# Patient Record
Sex: FEMALE | Race: WHITE | NOT HISPANIC OR LATINO | Employment: OTHER | URBAN - METROPOLITAN AREA
[De-identification: names, ages, dates, MRNs, and addresses within clinical notes are randomized per-mention and may not be internally consistent; named-entity substitution may affect disease eponyms.]

---

## 2017-08-17 PROBLEM — T45.515A: Status: ACTIVE | Noted: 2017-08-17

## 2017-12-28 ENCOUNTER — HOSPITAL ENCOUNTER (INPATIENT)
Facility: HOSPITAL | Age: 82
LOS: 1 days | Discharge: HOME | DRG: 087 | End: 2017-12-29
Attending: EMERGENCY MEDICINE | Admitting: PSYCHIATRY & NEUROLOGY
Payer: MEDICARE

## 2017-12-28 DIAGNOSIS — S00.83XA FACIAL HEMATOMA, INITIAL ENCOUNTER: Primary | ICD-10-CM

## 2017-12-28 DIAGNOSIS — M25.551 PAIN OF RIGHT HIP JOINT: ICD-10-CM

## 2017-12-28 DIAGNOSIS — S02.401A MAXILLARY SINUS FRACTURE, CLOSED, INITIAL ENCOUNTER: ICD-10-CM
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
## 2017-12-28 DIAGNOSIS — I60.9 SAH (SUBARACHNOID HEMORRHAGE): ICD-10-CM

## 2017-12-28 DIAGNOSIS — R06.00 DYSPNEA: ICD-10-CM
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
## 2017-12-28 LAB
ALBUMIN SERPL BCP-MCNC: 3 G/DL
ALP SERPL-CCNC: 81 U/L
ALT SERPL W/O P-5'-P-CCNC: 9 U/L
ANION GAP SERPL CALC-SCNC: 10 MMOL/L
AST SERPL-CCNC: 24 U/L
BASOPHILS # BLD AUTO: 0.04 K/UL
BASOPHILS NFR BLD: 0.3 %
BILIRUB SERPL-MCNC: 0.5 MG/DL
BNP SERPL-MCNC: 134 PG/ML
BUN SERPL-MCNC: 33 MG/DL
CALCIUM SERPL-MCNC: 9.6 MG/DL
CHLORIDE SERPL-SCNC: 104 MMOL/L
CHOLEST SERPL-MCNC: 213 MG/DL
CHOLEST/HDLC SERPL: 4.7 {RATIO}
CO2 SERPL-SCNC: 26 MMOL/L
CREAT SERPL-MCNC: 1.3 MG/DL
DIFFERENTIAL METHOD: ABNORMAL
EOSINOPHIL # BLD AUTO: 0.1 K/UL
EOSINOPHIL NFR BLD: 0.4 %
ERYTHROCYTE [DISTWIDTH] IN BLOOD BY AUTOMATED COUNT: 13 %
EST. GFR  (AFRICAN AMERICAN): 40.6 ML/MIN/1.73 M^2
EST. GFR  (NON AFRICAN AMERICAN): 35.2 ML/MIN/1.73 M^2
GLUCOSE SERPL-MCNC: 191 MG/DL
HCT VFR BLD AUTO: 37.1 %
HDLC SERPL-MCNC: 45 MG/DL
HDLC SERPL: 21.1 %
HGB BLD-MCNC: 11.9 G/DL
IMM GRANULOCYTES # BLD AUTO: 0.09 K/UL
IMM GRANULOCYTES NFR BLD AUTO: 0.7 %
INR PPP: 1.7
LDLC SERPL CALC-MCNC: 140.8 MG/DL
LYMPHOCYTES # BLD AUTO: 1.5 K/UL
LYMPHOCYTES NFR BLD: 10.7 %
MCH RBC QN AUTO: 29.7 PG
MCHC RBC AUTO-ENTMCNC: 32.1 G/DL
MCV RBC AUTO: 93 FL
MONOCYTES # BLD AUTO: 0.8 K/UL
MONOCYTES NFR BLD: 5.9 %
NEUTROPHILS # BLD AUTO: 11.2 K/UL
NEUTROPHILS NFR BLD: 82 %
NONHDLC SERPL-MCNC: 168 MG/DL
NRBC BLD-RTO: 0 /100 WBC
PLATELET # BLD AUTO: 157 K/UL
PMV BLD AUTO: 12.5 FL
POTASSIUM SERPL-SCNC: 4.3 MMOL/L
PROT SERPL-MCNC: 7.1 G/DL
PROTHROMBIN TIME: 17.1 SEC
RBC # BLD AUTO: 4.01 M/UL
SODIUM SERPL-SCNC: 140 MMOL/L
TRIGL SERPL-MCNC: 136 MG/DL
WBC # BLD AUTO: 13.61 K/UL

## 2017-12-28 PROCEDURE — 80061 LIPID PANEL: CPT

## 2017-12-28 PROCEDURE — 80053 COMPREHEN METABOLIC PANEL: CPT | Mod: 91

## 2017-12-28 PROCEDURE — 25000003 PHARM REV CODE 250: Performed by: EMERGENCY MEDICINE

## 2017-12-28 PROCEDURE — 85025 COMPLETE CBC W/AUTO DIFF WBC: CPT | Mod: 91

## 2017-12-28 PROCEDURE — 96365 THER/PROPH/DIAG IV INF INIT: CPT

## 2017-12-28 PROCEDURE — 84443 ASSAY THYROID STIM HORMONE: CPT

## 2017-12-28 PROCEDURE — 12000002 HC ACUTE/MED SURGE SEMI-PRIVATE ROOM

## 2017-12-28 PROCEDURE — 83880 ASSAY OF NATRIURETIC PEPTIDE: CPT

## 2017-12-28 PROCEDURE — 99291 CRITICAL CARE FIRST HOUR: CPT | Mod: ,,, | Performed by: EMERGENCY MEDICINE

## 2017-12-28 PROCEDURE — 86850 RBC ANTIBODY SCREEN: CPT

## 2017-12-28 PROCEDURE — 96366 THER/PROPH/DIAG IV INF ADDON: CPT

## 2017-12-28 PROCEDURE — 99285 EMERGENCY DEPT VISIT HI MDM: CPT

## 2017-12-28 PROCEDURE — 85610 PROTHROMBIN TIME: CPT | Mod: 91

## 2017-12-28 RX ORDER — GLUCAGON 1 MG
1 KIT INJECTION
Status: DISCONTINUED | OUTPATIENT
Start: 2017-12-29 | End: 2017-12-29 | Stop reason: HOSPADM

## 2017-12-28 RX ORDER — SODIUM CHLORIDE 0.9 % (FLUSH) 0.9 %
3 SYRINGE (ML) INJECTION EVERY 8 HOURS
Status: DISCONTINUED | OUTPATIENT
Start: 2017-12-29 | End: 2017-12-29 | Stop reason: HOSPADM

## 2017-12-28 RX ORDER — INSULIN ASPART 100 [IU]/ML
0-5 INJECTION, SOLUTION INTRAVENOUS; SUBCUTANEOUS EVERY 6 HOURS PRN
Status: DISCONTINUED | OUTPATIENT
Start: 2017-12-29 | End: 2017-12-29 | Stop reason: HOSPADM

## 2017-12-28 RX ORDER — NICARDIPINE HYDROCHLORIDE 0.2 MG/ML
5 INJECTION INTRAVENOUS CONTINUOUS
Status: DISCONTINUED | OUTPATIENT
Start: 2017-12-28 | End: 2017-12-29 | Stop reason: HOSPADM

## 2017-12-28 RX ORDER — AMOXICILLIN 250 MG
1 CAPSULE ORAL 2 TIMES DAILY
Status: DISCONTINUED | OUTPATIENT
Start: 2017-12-29 | End: 2017-12-29 | Stop reason: HOSPADM

## 2017-12-28 RX ORDER — ONDANSETRON 2 MG/ML
4 INJECTION INTRAMUSCULAR; INTRAVENOUS EVERY 8 HOURS PRN
Status: DISCONTINUED | OUTPATIENT
Start: 2017-12-29 | End: 2017-12-29 | Stop reason: HOSPADM

## 2017-12-28 RX ADMIN — NICARDIPINE HYDROCHLORIDE 7.5 MG/HR: 0.2 INJECTION, SOLUTION INTRAVENOUS at 10:12

## 2017-12-29 ENCOUNTER — TELEPHONE (OUTPATIENT)
Dept: NEUROSURGERY | Facility: CLINIC | Age: 82
End: 2017-12-29

## 2017-12-29 VITALS
RESPIRATION RATE: 17 BRPM | TEMPERATURE: 98 F | BODY MASS INDEX: 17.58 KG/M2 | DIASTOLIC BLOOD PRESSURE: 60 MMHG | HEIGHT: 64 IN | OXYGEN SATURATION: 97 % | HEART RATE: 83 BPM | WEIGHT: 103 LBS | SYSTOLIC BLOOD PRESSURE: 119 MMHG

## 2017-12-29 DIAGNOSIS — I60.9 SAH (SUBARACHNOID HEMORRHAGE): Primary | ICD-10-CM

## 2017-12-29 LAB
ABO + RH BLD: NORMAL
ALBUMIN SERPL BCP-MCNC: 2.7 G/DL
ALP SERPL-CCNC: 72 U/L
ALT SERPL W/O P-5'-P-CCNC: 8 U/L
ANION GAP SERPL CALC-SCNC: 9 MMOL/L
AST SERPL-CCNC: 17 U/L
BASOPHILS # BLD AUTO: 0.03 K/UL
BASOPHILS NFR BLD: 0.3 %
BILIRUB SERPL-MCNC: 0.6 MG/DL
BLD GP AB SCN CELLS X3 SERPL QL: NORMAL
BUN SERPL-MCNC: 34 MG/DL
CALCIUM SERPL-MCNC: 9.2 MG/DL
CHLORIDE SERPL-SCNC: 107 MMOL/L
CO2 SERPL-SCNC: 26 MMOL/L
CREAT SERPL-MCNC: 1.1 MG/DL
DIFFERENTIAL METHOD: ABNORMAL
EOSINOPHIL # BLD AUTO: 0 K/UL
EOSINOPHIL NFR BLD: 0.2 %
ERYTHROCYTE [DISTWIDTH] IN BLOOD BY AUTOMATED COUNT: 13.1 %
EST. GFR  (AFRICAN AMERICAN): 49.7 ML/MIN/1.73 M^2
EST. GFR  (NON AFRICAN AMERICAN): 43.1 ML/MIN/1.73 M^2
ESTIMATED AVG GLUCOSE: 103 MG/DL
GLUCOSE SERPL-MCNC: 132 MG/DL
HBA1C MFR BLD HPLC: 5.2 %
HCT VFR BLD AUTO: 33 %
HGB BLD-MCNC: 10.7 G/DL
IMM GRANULOCYTES # BLD AUTO: 0.04 K/UL
IMM GRANULOCYTES NFR BLD AUTO: 0.5 %
INR PPP: 1.3
LYMPHOCYTES # BLD AUTO: 1.5 K/UL
LYMPHOCYTES NFR BLD: 17.5 %
MAGNESIUM SERPL-MCNC: 1.3 MG/DL
MCH RBC QN AUTO: 29.6 PG
MCHC RBC AUTO-ENTMCNC: 32.4 G/DL
MCV RBC AUTO: 91 FL
MONOCYTES # BLD AUTO: 0.7 K/UL
MONOCYTES NFR BLD: 7.9 %
NEUTROPHILS # BLD AUTO: 6.4 K/UL
NEUTROPHILS NFR BLD: 73.6 %
NRBC BLD-RTO: 0 /100 WBC
PHOSPHATE SERPL-MCNC: 1.6 MG/DL
PLATELET # BLD AUTO: 179 K/UL
PMV BLD AUTO: 12.1 FL
POTASSIUM SERPL-SCNC: 3.7 MMOL/L
PROT SERPL-MCNC: 6.2 G/DL
PROTHROMBIN TIME: 13 SEC
RBC # BLD AUTO: 3.61 M/UL
SODIUM SERPL-SCNC: 142 MMOL/L
TSH SERPL DL<=0.005 MIU/L-ACNC: 2.05 UIU/ML
WBC # BLD AUTO: 8.7 K/UL

## 2017-12-29 PROCEDURE — 80053 COMPREHEN METABOLIC PANEL: CPT

## 2017-12-29 PROCEDURE — 85610 PROTHROMBIN TIME: CPT

## 2017-12-29 PROCEDURE — 84100 ASSAY OF PHOSPHORUS: CPT

## 2017-12-29 PROCEDURE — 25000003 PHARM REV CODE 250: Performed by: EMERGENCY MEDICINE

## 2017-12-29 PROCEDURE — 99239 HOSP IP/OBS DSCHRG MGMT >30: CPT | Mod: ,,, | Performed by: PSYCHIATRY & NEUROLOGY

## 2017-12-29 PROCEDURE — 85025 COMPLETE CBC W/AUTO DIFF WBC: CPT

## 2017-12-29 PROCEDURE — 83036 HEMOGLOBIN GLYCOSYLATED A1C: CPT

## 2017-12-29 PROCEDURE — 83735 ASSAY OF MAGNESIUM: CPT

## 2017-12-29 RX ADMIN — NICARDIPINE HYDROCHLORIDE 7.5 MG/HR: 0.2 INJECTION, SOLUTION INTRAVENOUS at 12:12

## 2017-12-29 NOTE — DISCHARGE INSTRUCTIONS
You do no have a subarachnoid hemorrhage.    Sleep with head of bed elevated, no nose blowing.  Follow-up with ENT

## 2017-12-29 NOTE — H&P
History & Physical  Neurocritical Care    Admit Date: 12/28/2017  LOS: 0    Code Status: Full Code     CC: SAH (subarachnoid hemorrhage)    SUBJECTIVE:     History of Present Illness:   89 y.o. female with a PMHx of DVT/PE (on coumadin) and lupus who presents to the ED via EMS as a transfer from [place] with a chief complaint of SAH and R maxillary fracture sustained today s/p fall. Patient was reportedly found on the ground s/p fall at the assisted living facility. She does not remember the fall. The patient endorses severe right face pain, right hip and leg pain and lower back pain.     The head CT done at the other facility shows a small subarachnoid hemorrhage of the posterior right parietal and frontal lobe, and a comminuted fracture of the anterior lateral wall of the maxillary sinus with hemorrhage into the sinus and inferior orbital rim. She is oriented to person, place and time.     Pt reportedly had desaturation issue en route and placed on non-rebreather. Pt currently oxygenating well on 2L NC. Pt received Vit K at OSH.        Past Medical History:   Diagnosis Date    Anticoagulant long-term use     DVT (deep venous thrombosis)     Lupus     Pulmonary embolus      No past surgical history on file.  Current Facility-Administered Medications on File Prior to Encounter   Medication Dose Route Frequency Provider Last Rate Last Dose    [COMPLETED] albuterol-ipratropium 2.5mg-0.5mg/3mL nebulizer solution 3 mL  3 mL Nebulization ED 1 Time Joao Lucero MD   3 mL at 12/28/17 2107    [COMPLETED] phytonadione vitamin k (AQUA-MEPHYTON) 10 mg in dextrose 5 % 50 mL IVPB  10 mg Intravenous ED 1 Time Jose Miguel Adams MD 50 mL/hr at 12/28/17 2053 10 mg at 12/28/17 2053    [DISCONTINUED] niCARdipine 40 mg/200 mL infusion  5 mg/hr Intravenous Continuous Jose Miguel Adams MD 37.5 mL/hr at 12/28/17 2028 7.5 mg/hr at 12/28/17 2028     Current Outpatient Prescriptions on File Prior to Encounter   Medication Sig  Dispense Refill    warfarin (COUMADIN) 2.5 MG tablet Take 2.5 mg by mouth every evening. Take at 5pm      cyproheptadine (PERIACTIN) 4 mg tablet Take 4 mg by mouth 2 (two) times daily.      potassium chloride (MICRO-K) 10 MEQ CpSR Take 10 mEq by mouth every morning.       propranolol (INDERAL) 20 MG tablet Take 20 mg by mouth 2 (two) times daily.      triamterene-hydrochlorothiazide 37.5-25 mg (DYAZIDE) 37.5-25 mg per capsule Take 1 capsule by mouth every morning.      [DISCONTINUED] acetaminophen (TYLENOL) 325 MG tablet Take 325 mg by mouth every 6 (six) hours as needed for Pain.      [DISCONTINUED] nitrofurantoin (MACRODANTIN) 50 MG capsule Take 50 mg by mouth every evening.       Review of patient's allergies indicates:  No Known Allergies  No family history on file.  Social History   Substance Use Topics    Smoking status: Not on file    Smokeless tobacco: Not on file    Alcohol use Not on file      Review of Symptoms:  Constitutional: Denies fevers, weight loss, chills, or weakness. Endorses R facial, R hip and back pain.   Eyes: Denies changes in vision.  ENT: Denies dysphagia, nasal discharge, ear pain or discharge.  Cardiovascular: Denies chest pain, palpitations, orthopnea, or claudication.  Respiratory: Denies shortness of breath, cough, hemoptysis, or wheezing.  GI: Denies nausea/vomitting, hematochezia, melena, abd pain, or changes in appetite.  : Denies dysuria, incontinence, or hematuria.  Musculoskeletal: Endorses R facial, R hip and back pain.   Skin/breast: Denies rashes, lumps, lesions, or discharge.  Neurologic: Denies headache, dizziness, vertigo, or paresthesias.  Psychiatric: Denies changes in mood or hallucinations.  Endocrine: Denies polyuria, polydipsia, heat/cold intolerance.  Hematologic/Lymph: Denies lymphadenopathy, easy bruising or easy bleeding.  Allergic/Immunologic: Denies rash, rhinitis.     OBJECTIVE:   Vital Signs (Most Recent):   Pulse: 100 (12/28/17 2240)  Resp: 20  (12/28/17 2240)  BP: (!) 132/59 (12/28/17 2240)  SpO2: 97 % (12/28/17 2240)    Vital Signs (24h Range):   Temp:  [98.8 °F (37.1 °C)] 98.8 °F (37.1 °C)  Pulse:  [] 100  Resp:  [18-31] 20  SpO2:  [97 %-100 %] 97 %  BP: (132-218)/(59-93) 132/59    I & O (Last 24h):  No intake or output data in the 24 hours ending 12/28/17 2341  Physical Exam:  GA: Alert, comfortable, no acute distress.   HEENT: No scleral icterus or JVD.   Pulmonary: Clear to auscultation A/P/L. No wheezing, crackles, or rhonchi.  Cardiac: RRR S1 & S2 w/o rubs/murmurs/gallops.   Abdominal: Bowel sounds present x 4. No appreciable hepatosplenomegaly.  Skin: R facial ecchymosis and R periorbital edema. R hip TTP.   Neuro:  --GCS: E4 V5 M6  --Mental Status:  AAOx3  --CN II-XII grossly intact.   --Pupils 3mm, PERRL. Irregular R pupil  --Corneal reflex, gag, cough intact.  --LUE strength: 4/5  --RUE strength: 4/5  --LLE strength: 4/5  --RLE strength: 4/5    Lines/Drains/Airway:          Nutrition/Tube Feeds:   Current Diet Order   Procedures    Diet NPO     No food intake until passes CATRACHO or evaluated by speech.       Labs:  ABG: No results for input(s): PH, PO2, PCO2, HCO3, POCSATURATED, BE in the last 24 hours.  BMP:    Recent Labs  Lab 12/28/17 2242      K 4.3      CO2 26   BUN 33*   CREATININE 1.3   *     LFT:   Lab Results   Component Value Date    AST 24 12/28/2017    ALT 9 (L) 12/28/2017    ALKPHOS 81 12/28/2017    BILITOT 0.5 12/28/2017    ALBUMIN 3.0 (L) 12/28/2017    PROT 7.1 12/28/2017     CBC:   Lab Results   Component Value Date    WBC 13.61 (H) 12/28/2017    HGB 11.9 (L) 12/28/2017    HCT 37.1 12/28/2017    MCV 93 12/28/2017     12/28/2017     Microbiology x 7d:   Microbiology Results (last 7 days)     ** No results found for the last 168 hours. **        Imaging:  CTH:    Final read pending, no observed SAH per my assessment    CT maxillary:    R maxillary fx    I personally reviewed the above  image.    ASSESSMENT/PLAN:     Patient Active Problem List    Diagnosis Date Noted    SAH (subarachnoid hemorrhage) 12/28/2017    Coumarin adverse reaction 08/17/2017     Neuro:   - txf for reported SAH, repeat imaging here initially appears neg for SAH, final read pending  - NSGY consulted, appreciate recs, no need for acute intervention  - pt received Vit K @ OSH, no need for additional reversal  - will d/w staff need to admit to NCC for cont monitoring  - SBP <160    Pulmonary:   - report of desaturation en route, currently stable  - CXR unremarkable  - will cont to monitor, wean O2 as tolerated    Cardiac:   - no n/o dz  - HTN, cardene ggt prn     Renal:    - BUN/Cr mildly elevated  - IVF, monitor Uo/p    ID:   - no s/s of infection  - will cont to monitor    Hem/Onc:   - H/H stable  - no acute issues  - on coumadin for h/o DVT, will hold currently, received Vit K @ OSH, no need for reversal    Endocrine:    - no h/o dz  - SSI    Fluids/Electrolytes/Nutrition/GI:   - advance diet as tolerated  - IVF  - replete lytes prn    Hip pain:   - imaging completed, f/u ortho recs    Dispo: no need to admit to NCC if no SAH noted on imaging, f/u ortho recs    Tremayne Burk MD  [facility]

## 2017-12-29 NOTE — ED TRIAGE NOTES
Pt presents to  ED as transfer from [place] for SAH and depressed maxillary fracture. Pt slipped and fell today. Pt currently takes Coumidin daily. Vitamin K given at [facility]. Pt currently on Cardene for BP control. Pt AAOx4 at this time.

## 2017-12-29 NOTE — TELEPHONE ENCOUNTER
----- Message from John Sullivan MD sent at 12/29/2017  1:15 AM CST -----  Please arrange for this pt to follow up with pa in 2 weeks. Thank you kindly.

## 2017-12-29 NOTE — ED NOTES
Patients caregiver explained to [facility] transportation, caregiver stated that she was comfortable bring her back to [place] in her own car. Patient provided multiple blankets and gowns due to weather. Patients ivs removed, catheter intact. Patients caregiver states that she understands discharge instructions that were provided to her and given paperwork. Catheter removed, patient encouraged to drink lots of fluids throughout the day. Patient placed in vehicle by myself and another RN. No further questions at time of discharge.

## 2017-12-29 NOTE — ED PROVIDER NOTES
Encounter Date: 12/28/2017    SCRIBE #1 NOTE: I, Missy Mireles, am scribing for, and in the presence of, Dr. Church.       History     Chief Complaint   Patient presents with    SAH     transfer from [facility] for SAH and depressed maxillary fracture, pt currently AAOx4, moving all extremities, pt takes Coumidin      Time patient was seen by the provider: 10:31 PM      This is a 89 y.o. female with a PMHx of DVT, PE and lupus who presents to the ED via EMS as a transfer from [place] with a chief complaint of subarachnoid hemorrhage and depressed maxillary fracture sustained today. Patient was reportedly found on the ground s/p fall at the assisted living facility. She does not remember the fall. The patient is on Coumadin. The patient endorses severe right face pain, right hip and leg pain. The head CT done at the other facility shows a small subarachnoid hemorrhage of the posterior right parietal and frontal lobe, and a comminuted depressed fracture of the anterior lateral wall of the maxillary sinus with hemorrhage into the sinus and inferior orbital rim. She is oriented to person and place, not to time.       The history is provided by the patient, the EMS personnel and medical records.     Review of patient's allergies indicates:  No Known Allergies  Past Medical History:   Diagnosis Date    Anticoagulant long-term use     DVT (deep venous thrombosis)     Lupus     Pulmonary embolus      No past surgical history on file.  No family history on file.  Social History   Substance Use Topics    Smoking status: Not on file    Smokeless tobacco: Not on file    Alcohol use Not on file     Review of Systems   Unable to perform ROS: Acuity of condition   HENT:        Positive for right facial pain.    Musculoskeletal: Positive for arthralgias (right hip, leg).       Physical Exam     Initial Vitals   BP Pulse Resp Temp SpO2   -- -- -- -- --      MAP       --         Vitals:    12/29/17 0031   BP: (!) 109/56    Pulse: 94   Resp:        Physical Exam    Nursing note and vitals reviewed.  Constitutional: She appears well-developed and well-nourished. She is not diaphoretic. No distress.   HENT:   Head: Normocephalic.   Mouth/Throat: Oropharynx is clear and moist.   Significant periorbital hematoma on the right.  Significant facial swelling on the right.    Eyes:   Irregular pupil on the right.   Neck: Normal range of motion. Neck supple. No JVD present.   Cardiovascular: Normal rate, regular rhythm, normal heart sounds and intact distal pulses.   Pulmonary/Chest: No respiratory distress. She has no wheezes. She has no rhonchi. She has rales (left base).   Abdominal: Soft. She exhibits no distension. There is no tenderness.   Genitourinary:   Genitourinary Comments: Cai catheter in place.   Musculoskeletal: Normal range of motion. She exhibits tenderness (right hip). She exhibits no edema.   Lymphadenopathy:     She has no cervical adenopathy.   Neurological: She is alert. She has normal strength. No cranial nerve deficit or sensory deficit.   Alert and oriented to person and place, not to time.    Skin: Skin is warm and dry.         ED Course   Procedures  Labs Reviewed   CBC W/ AUTO DIFFERENTIAL - Abnormal; Notable for the following:        Result Value    WBC 13.61 (*)     Hemoglobin 11.9 (*)     Immature Granulocytes 0.7 (*)     Gran # 11.2 (*)     Immature Grans (Abs) 0.09 (*)     Gran% 82.0 (*)     Lymph% 10.7 (*)     All other components within normal limits   COMPREHENSIVE METABOLIC PANEL - Abnormal; Notable for the following:     Glucose 191 (*)     BUN, Bld 33 (*)     Albumin 3.0 (*)     ALT 9 (*)     eGFR if  40.6 (*)     eGFR if non  35.2 (*)     All other components within normal limits   PROTIME-INR - Abnormal; Notable for the following:     Prothrombin Time 17.1 (*)     INR 1.7 (*)     All other components within normal limits   B-TYPE NATRIURETIC PEPTIDE - Abnormal; Notable  for the following:      (*)     All other components within normal limits    Narrative:     ADD ON LIPID AND TSH PER DR JAZMYN CARD/ORDER# [id number] AND   [id number] @ 23:34 12/28/17   LIPID PANEL - Abnormal; Notable for the following:     Cholesterol 213 (*)     All other components within normal limits    Narrative:     ADD ON LIPID AND TSH PER DR JAZMYN CARD/ORDER# [id number] AND   [id number] @ 23:34 12/28/17   HEMOGLOBIN A1C   LIPID PANEL   TSH   TSH    Narrative:     ADD ON LIPID AND TSH PER DR JAZMYN CARD/ORDER# [id number] AND   [id number] @ 23:34 12/28/17   HEMOGLOBIN A1C   TYPE & SCREEN   POCT GLUCOSE MONITORING CONTINUOUS   POCT GLUCOSE MONITORING CONTINUOUS     EKG Readings: (Independently Interpreted)   Normal sinus at 82 with no ischemic changes       X-Rays:   Independently Interpreted Readings:   Other Readings:  Chest x-ray: No acute process    Medical Decision Making:   History:   Old Medical Records: I decided to obtain old medical records.  Old Records Summarized: records from another hospital.       <> Summary of Records: Transfer patient. She is 89, presented via EMS after found on the ground at the assisted living facility. She tripped on the way to the dining room table. She does not remember the fall. She's on Coumadin. Her complaint at the other hospital was right face pain, right hip and leg pain. History of DVT, lupus, PE.     Head CT done at 6:46 PM at the other facility shows a small subarachnoid hemorrhage of the posterior right parietal and frontal lobe, and a comminuted depressed fracture of the anterior lateral wall of the maxillary sinus with hemorrhage into the sinus and inferior orbital rim.  Initial Assessment:   Emergent evaluation of 89 y.o. female transferred from outside facility for emergent Neuro Critical Care consult and Neurosurgery consult. On arrival, patient is awake, alert, and oriented x2, following all commands, answering questions appropriately. She's  neurologically intact. Neuro Critical Care and Neurosurgery consulted. She was reversed with vitamin K.     10:41 PM Neuro Critical Care contacted, case discussed. They will come evaluate the patient.     11:30 PM  NS at bedside, evaluated patient.  Pt will be admitted to [facility]  Clinical Tests:   Lab Tests: Ordered and Reviewed  Radiological Study: Ordered and Reviewed  Other:   I have discussed this case with another health care provider.            Scribe Attestation:   Scribe #1: I performed the above scribed service and the documentation accurately describes the services I performed. I attest to the accuracy of the note.    Attending Attestation:         Attending Critical Care:   Critical Care Times:   ==============================================================  · Total Critical Care Time - exclusive of procedural time: 35 minutes.  ==============================================================  Critical care was necessary to treat or prevent imminent or life-threatening deterioration of the following conditions: CNS failure (SAH).   Critical care was time spent personally by me on the following activities: examination of patient, ordering lab, x-rays, and/or EKG, review of old charts, ordering and performing treatments and interventions, discussion with consultants and evaluation of patient's response to treatment.   Critical Care Condition: potentially life-threatening               ED Course      Clinical Impression:   The primary encounter diagnosis was Facial hematoma, initial encounter. Diagnoses of Dyspnea, SAH (subarachnoid hemorrhage), and Maxillary sinus fracture, closed, initial encounter were also pertinent to this visit.    Disposition:   Disposition: Admitted  Condition: Serious                        Eryn Church MD  12/29/17 0051       Eryn Church MD  12/29/17 0052

## 2017-12-29 NOTE — PT/OT/SLP PROGRESS
Occupational Therapy  Not Seen      Patient Name:  Damaris Gutierrez   MRN:  [id number]    Per RN, Patient with plans for d/c and return to [facility].     LEILA Denise  12/29/2017

## 2017-12-29 NOTE — ED NOTES
The patient is awake, alert and cooperative with a calm affect, patient is aware of environment. Airway is open and patent, respirations are spontaneous, normal respiratory effort and rate noted, skin warm and dry, moves all extremities well, appearance: no apparent distress noted. Side rails x 2. Call bell within reach. Will continue to monitor.  Patient aware she is waiting for [facility] transportation.

## 2017-12-29 NOTE — CONSULTS
Consult Note  Neurosurgery    Admit Date: 12/28/2017  LOS: 1    Code Status: Full Code     CC: SAH (subarachnoid hemorrhage)    SUBJECTIVE:     History of Present Illness: 89 yo female with pmh of dvt on warfarin (INR 1.7) admitted to [facility] s/p mechanical fall for multiple mild facial fractures and concern for tSAH.     On exam pt is oriented to name only, otherwise with no neuro deficit. Periorbital trauma noted on exam on right. EOM normal. PERRL. No vomiting. No rhinorrhea. No positional headaches.    Interval imaging shows mild minimally displaced right orbital wall and floor and anterior maxillary wall fractures. Head CT without acute intracranial process. Cervical spine CT without acute process.    Neurosurgery is consulted for closed head injury in patient on warfarin.           OBJECTIVE:   Vital Signs (Most Recent):   Pulse: 94 (12/29/17 0031)  Resp: 20 (12/28/17 2240)  BP: (!) 109/56 (12/29/17 0031)  SpO2: 97 % (12/29/17 0031)    Vital Signs (24h Range):   Temp:  [98.8 °F (37.1 °C)] 98.8 °F (37.1 °C)  Pulse:  [] 94  Resp:  [18-31] 20  SpO2:  [93 %-100 %] 97 %  BP: (109-218)/(56-93) 109/56      I & O (Last 24h):  No intake or output data in the 24 hours ending 12/29/17 0109    Physical Exam:  General: well developed, well nourished, no distress.   Head: normocephalic, left periorbital edema and ecchymosis.  Cervical Spine: No midline tenderness to palpation.  Thoracolumbosacral Spine: No midline tenderness to palpation.  GCS: Motor: 6/Verbal: 5/Eyes: 4 GCS Total: 14  Mental Status: Awake, Alert, Oriented to name only  Language: No aphasia  Speech: No dysarthria  Facial Droop: None   Cranial nerves: CN III-XII grossly intact.  Visual Fields: Intact.   Eyes: Pupils equal and reactive to light. Intact Conjugate horizontal and vertical pursuit. No nystagmus. No gaze deviation.   Pulmonary: No distress.  Sensory: No deficit.  Propioception: No deficit in 1st digit of toe bilaterally.  Rectal Tone: Not  tested.  Drift: None.  Upper Extremity Ataxia: No Dysmetria Bilaterally  Lower Extremity Ataxia: Not tested.  Dysdiadochokinesia: Not tested.  Reflexes: 2+ patellar bilaterally.  Daniel: Absent  Clonus: Absent  Babinski: Absent  Romberg: Not Tested  Pulses: Brisk and symmetric radial, DP and tibial pulses.  Motor Strength:    Strength  Shoulder Abduction Elbow Extension Elbow Flexion Wrist Extension Wrist Flexion Finger Opposition Finger Add Finger Abd   Upper: R 5/5 5/5 5/5 5/5 5/5 5/5 5/5 5/5    L 5/5 5/5 5/5 5/5 5/5 5/5 5/5 5/5     Hip Flexion Knee Extension Knee  Flexion Ankle Dflexion Ankle Pflexion EHL     Lower: R 5/5 5/5 5/5 5/5 5/5 5/5      L 5/5 5/5 5/5 5/5 5/5 5/5             Lines/Drains/Airway:          Nutrition/Tube Feeds:   Current Diet Order   Procedures    Diet NPO     No food intake until passes CATRACHO or evaluated by speech.       Labs:  ABG: No results for input(s): PH, PO2, PCO2, HCO3, POCSATURATED, BE in the last 24 hours.  BMP:  Recent Labs  Lab 12/28/17  2242      K 4.3      CO2 26   BUN 33*   CREATININE 1.3   *     LFT: Lab Results   Component Value Date    AST 24 12/28/2017    ALT 9 (L) 12/28/2017    ALKPHOS 81 12/28/2017    BILITOT 0.5 12/28/2017    ALBUMIN 3.0 (L) 12/28/2017    PROT 7.1 12/28/2017     CBC:   Lab Results   Component Value Date    WBC 13.61 (H) 12/28/2017    HGB 11.9 (L) 12/28/2017    HCT 37.1 12/28/2017    MCV 93 12/28/2017     12/28/2017     Microbiology x 7d:   Microbiology Results (last 7 days)     ** No results found for the last 168 hours. **            ASSESSMENT/PLAN:   89 yo female with pmh of dvt on warfarin now s/p mechanical fall with multiple right mild facial fractures and closed head injury. No acute intracranial process on interval imaging. Pt is admitted to neurocritical care.     --No acute neurosurgical intervention required.  --No need from neurosurgical perspective for anti-coagulant reversal.  --Cleared for discharge from  neurosurgical perspective.  --Will arrange for pt to follow up in clinic in two weeks for interval imaging.  --Discussed above with patient and primary team.  --Neurosurgery will be signing off, please contact us with any questions or concerns.     John Sullivan

## 2017-12-29 NOTE — CONSULTS
Inpatient consult to Physical Medicine Rehab  Consult performed by: NOE MAGAÑA  Consult ordered by: ORQUIDEA BLANTON  Reason for consult: assess rehab needs      Reviewed patient history and current admission.  Rehab team following.  Full consult to follow.    KEVYN Gamboa, FNP-C  Physical Medicine & Rehabilitation   12/29/2017  Spectralink: [id number]

## 2017-12-29 NOTE — CONSULTS
Consult Note  Orthopedic Surgery      SUBJECTIVE:     History of Present Illness:  Damaris Gutierrez is a 89 y.o. female who presents with right sided face pain, head ache, and right hip pain after a fall from standing at nursing home. Patient does not recall events, but reports but endorses hitting head. Preliminary read of head CT and facial CT at outside ED concerning for maxillary fractures and subarachnoid hemorrhage. Orthopedics consulted for right hip pain. Denies hip pain prior to fall. Endorses pain to bilateral hands, but otherwise denies other MSK pains.      Past Medical History:   Diagnosis Date    Anticoagulant long-term use     DVT (deep venous thrombosis)     Lupus     Pulmonary embolus        No past surgical history on file.    No family history on file.    Social History     Social History    Marital status:      Spouse name: N/A    Number of children: N/A    Years of education: N/A     Social History Main Topics    Smoking status: None    Smokeless tobacco: None    Alcohol use None    Drug use: Unknown    Sexual activity: Not Asked     Other Topics Concern    None     Social History Narrative    None       Current Facility-Administered Medications   Medication Dose Route Frequency Provider Last Rate Last Dose    dextrose 50% injection 12.5 g  12.5 g Intravenous PRN Tremayne Burk MD        glucagon (human recombinant) injection 1 mg  1 mg Intramuscular PRN Tremayne Burk MD        insulin aspart pen 0-5 Units  0-5 Units Subcutaneous Q6H PRN Tremayne Burk MD        niCARdipine 40 mg/200 mL infusion  5 mg/hr Intravenous Continuous Eryn Church MD   Stopped at 12/29/17 0210    ondansetron injection 4 mg  4 mg Intravenous Q8H PRN Tremayne Burk MD        senna-docusate 8.6-50 mg per tablet 1 tablet  1 tablet Oral BID Tremayne Burk MD        sodium chloride 0.9% flush 3 mL  3 mL Intravenous Q8H Tremayne Burk MD         Current  Outpatient Prescriptions   Medication Sig Dispense Refill    warfarin (COUMADIN) 2.5 MG tablet Take 2.5 mg by mouth every evening. Take at 5pm      cyproheptadine (PERIACTIN) 4 mg tablet Take 4 mg by mouth 2 (two) times daily.      potassium chloride (MICRO-K) 10 MEQ CpSR Take 10 mEq by mouth every morning.       propranolol (INDERAL) 20 MG tablet Take 20 mg by mouth 2 (two) times daily.      triamterene-hydrochlorothiazide 37.5-25 mg (DYAZIDE) 37.5-25 mg per capsule Take 1 capsule by mouth every morning.         Review of patient's allergies indicates:  No Known Allergies      Review of Systems:  ROS: Patient denies constitutional symptoms, cardiac symptoms, respiratory symptoms, GI symptoms. The remainder of the musculoskeletal ROS is included in the HPI.      OBJECTIVE:     Vital Signs (Most Recent)  Vitals:    12/29/17 0440 12/29/17 0441 12/29/17 0501 12/29/17 0540   BP: 124/60 124/60 125/61 127/60   Pulse: 79 79 86 87   Resp: 20   17   SpO2: 96% 96% 97% 96%   Weight:       Height:             Physical Exam:  Constitutional:  NAD; well-developed and well-nourished  Pulmonary/Chest: Effort normal  Skin: Warm and dry, bruising to right side of face  Neuro: Alert and oriented to person, place, and time; no focal numbness or weakness    RLE:  Skin intact throughout  Pain with ROM of hip  No pain with full ROM of knee or ankle  Some tenderness over distal femur and distal tibia/fibula  EHL, FHL, GC, TA intact  SILT L2-S1    LLE:  Skin intact throughout  No pain with full ROM of hip, knee or ankle  Some tenderness over distal femur and distal tibia/fibula  EHL, FHL, GC, TA intact  SILT L2-S1    BUE:  Skin intact throughout  2x2 cm area of ecchymosis over right thenar eminence  Painless ROM of shoulder, elbow, and wrist  Tenderness diffusely over bilateral hands  AIN, PIN, M, R, U intact  SILT C5-T1    Diagnostic Results:   X-Ray: Reviewed bilateral femur, bilateral tib fib, bilateral hands, lumbar spine, CT  cervical spine, MRI pelvis reviewed: no acute fracture, dislocation, or osseous lesion noted    ASSESSMENT/PLAN:     89 y.o. female with right hip pain, maxillary fractures and possible SAH  - Will defer treatment of maxillary fractures and SAH to ER  - No evidence of fracture on imaging, namely, no evidence of hip fracture on MRI  - No acute orthopedic intervention    Jerald Mireles MD PGY-2  Orthopedic Surgery

## 2018-01-16 NOTE — DISCHARGE SUMMARY
Discharge Summary  Critical Care    Admit Date: 12/28/2017    Discharge Date: 12/29/2017    LOS: 1    Principle Diagnosis: SAH (subarachnoid hemorrhage)    Secondary Diagnoses:   Active Hospital Problems    Diagnosis  POA    *SAH (subarachnoid hemorrhage) [I60.9]  Yes      Resolved Hospital Problems    Diagnosis Date Resolved POA   No resolved problems to display.        HPI:  89 y.o. female with a PMHx of DVT/PE (on coumadin) and lupus who presents to the ED via EMS as a transfer from [facility] with a chief complaint of SAH and R maxillary fracture sustained today s/p fall. Patient was reportedly found on the ground s/p fall at the assisted living facility. She does not remember the fall. The patient endorses severe right face pain, right hip and leg pain and lower back pain.      The head CT done at the other facility shows a small subarachnoid hemorrhage of the posterior right parietal and frontal lobe, and a comminuted fracture of the anterior lateral wall of the maxillary sinus with hemorrhage into the sinus and inferior orbital rim. She is oriented to person, place and time.      Pt reportedly had desaturation issue en route and placed on non-rebreather. Pt currently oxygenating well on 2L NC. Pt received Vit K at OSH.    Hospital/ICU Course:  Patient examined by NSGY and ortho as well as [facility]. No SAH on repeat imaging. No need for hospital admission. Transferred back to [facility]    Significant Imaging:  See radiology reports    Significant Laboratory Data:  No results found for this or any previous visit (from the past 336 hour(s)).  No results found for this or any previous visit (from the past 336 hour(s)).  Lab Results   Component Value Date    HGBA1C 5.2 12/29/2017     Microbiology Results (last 7 days)     ** No results found for the last 168 hours. **            Consultations:  IP CONSULT TO NEUROSURGERY  IP CONSULT TO PHYSICAL MEDICINE REHAB  IP CONSULT TO ORTHOPEDIC  SURGERY      Procedures:  none    Discharge Medications:   BrendaDamaris   Home Medication Instructions ADELE:[id number]    Printed on:01/16/18 2656   Medication Information                      cyproheptadine (PERIACTIN) 4 mg tablet  Take 4 mg by mouth 2 (two) times daily.             potassium chloride (MICRO-K) 10 MEQ CpSR  Take 10 mEq by mouth every morning.              propranolol (INDERAL) 20 MG tablet  Take 20 mg by mouth 2 (two) times daily.             triamterene-hydrochlorothiazide 37.5-25 mg (DYAZIDE) 37.5-25 mg per capsule  Take 1 capsule by mouth every morning.             warfarin (COUMADIN) 2.5 MG tablet  Take 2.5 mg by mouth every evening. Take at 5pm               Diet: as tolerated    Level of Activity: As tolerated.    Follow up Plan:  1) Follow up with primary care physician in 1-2 weeks.   2) follow up NSGY and ortho per their recs    Studies Pending: None    Discharge Disposition:  Patient was discharged back to [facility] living in stable condition.    This discharge took more than 30 minutes to complete.

## 2018-06-23 PROBLEM — R09.02 HYPOXIA: Status: ACTIVE | Noted: 2018-06-23

## 2018-06-24 PROBLEM — J96.01 ACUTE RESPIRATORY FAILURE WITH HYPOXIA: Status: ACTIVE | Noted: 2018-06-24

## 2018-06-24 PROBLEM — J20.8 ACUTE BRONCHITIS DUE TO OTHER SPECIFIED ORGANISMS: Status: ACTIVE | Noted: 2018-06-24

## 2018-06-24 PROBLEM — R63.8 INADEQUATE ORAL INTAKE: Status: ACTIVE | Noted: 2018-06-24

## 2018-06-25 PROBLEM — R63.8 INADEQUATE ORAL INTAKE: Status: ACTIVE | Noted: 2018-06-25

## 2018-06-25 PROBLEM — R09.02 HYPOXIA: Status: ACTIVE | Noted: 2018-06-25

## 2018-06-26 PROBLEM — J18.9 PNEUMONIA: Status: ACTIVE | Noted: 2018-06-26

## 2019-03-28 PROBLEM — J44.1 COPD EXACERBATION: Status: ACTIVE | Noted: 2019-03-28

## 2022-08-31 PROBLEM — I10 HTN (HYPERTENSION): Status: ACTIVE | Noted: 2022-08-31

## 2022-08-31 PROBLEM — F03.90 DEMENTIA WITHOUT BEHAVIORAL DISTURBANCE: Status: ACTIVE | Noted: 2022-08-31

## 2022-08-31 PROBLEM — K66.8 CYSTIC LESION OF ABDOMINAL VISCERA: Status: ACTIVE | Noted: 2022-08-31

## 2022-08-31 PROBLEM — Z86.718 HISTORY OF DVT (DEEP VEIN THROMBOSIS): Status: ACTIVE | Noted: 2022-08-31

## 2022-08-31 PROBLEM — E87.5 HYPERKALEMIA: Status: ACTIVE | Noted: 2022-08-31
